# Patient Record
Sex: FEMALE | Race: OTHER | HISPANIC OR LATINO | URBAN - METROPOLITAN AREA
[De-identification: names, ages, dates, MRNs, and addresses within clinical notes are randomized per-mention and may not be internally consistent; named-entity substitution may affect disease eponyms.]

---

## 2022-08-14 ENCOUNTER — EMERGENCY (EMERGENCY)
Facility: HOSPITAL | Age: 42
LOS: 1 days | Discharge: HOME | End: 2022-08-14
Admitting: EMERGENCY MEDICINE

## 2022-08-14 VITALS
DIASTOLIC BLOOD PRESSURE: 82 MMHG | OXYGEN SATURATION: 99 % | HEART RATE: 102 BPM | SYSTOLIC BLOOD PRESSURE: 130 MMHG | TEMPERATURE: 98 F | RESPIRATION RATE: 19 BRPM

## 2022-08-14 LAB
ALBUMIN SERPL ELPH-MCNC: 4.9 G/DL — SIGNIFICANT CHANGE UP (ref 3.3–5)
ALP SERPL-CCNC: 134 U/L — HIGH (ref 40–120)
ALT FLD-CCNC: 89 U/L — HIGH (ref 4–33)
AMPHET UR-MCNC: NEGATIVE — SIGNIFICANT CHANGE UP
ANION GAP SERPL CALC-SCNC: 14 MMOL/L — SIGNIFICANT CHANGE UP (ref 7–14)
APAP SERPL-MCNC: <10 UG/ML — LOW (ref 15–25)
APPEARANCE UR: CLEAR — SIGNIFICANT CHANGE UP
AST SERPL-CCNC: 53 U/L — HIGH (ref 4–32)
B PERT DNA SPEC QL NAA+PROBE: SIGNIFICANT CHANGE UP
B PERT+PARAPERT DNA PNL SPEC NAA+PROBE: SIGNIFICANT CHANGE UP
BACTERIA # UR AUTO: ABNORMAL
BARBITURATES UR SCN-MCNC: NEGATIVE — SIGNIFICANT CHANGE UP
BASOPHILS # BLD AUTO: 0.04 K/UL — SIGNIFICANT CHANGE UP (ref 0–0.2)
BASOPHILS NFR BLD AUTO: 0.6 % — SIGNIFICANT CHANGE UP (ref 0–2)
BENZODIAZ UR-MCNC: NEGATIVE — SIGNIFICANT CHANGE UP
BILIRUB SERPL-MCNC: 0.2 MG/DL — SIGNIFICANT CHANGE UP (ref 0.2–1.2)
BILIRUB UR-MCNC: NEGATIVE — SIGNIFICANT CHANGE UP
BORDETELLA PARAPERTUSSIS (RAPRVP): SIGNIFICANT CHANGE UP
BUN SERPL-MCNC: 10 MG/DL — SIGNIFICANT CHANGE UP (ref 7–23)
C PNEUM DNA SPEC QL NAA+PROBE: SIGNIFICANT CHANGE UP
CALCIUM SERPL-MCNC: 9 MG/DL — SIGNIFICANT CHANGE UP (ref 8.4–10.5)
CHLORIDE SERPL-SCNC: 106 MMOL/L — SIGNIFICANT CHANGE UP (ref 98–107)
CO2 SERPL-SCNC: 21 MMOL/L — LOW (ref 22–31)
COCAINE METAB.OTHER UR-MCNC: NEGATIVE — SIGNIFICANT CHANGE UP
COLOR SPEC: YELLOW — SIGNIFICANT CHANGE UP
CREAT SERPL-MCNC: 0.77 MG/DL — SIGNIFICANT CHANGE UP (ref 0.5–1.3)
CREATININE URINE RESULT, DAU: 128 MG/DL — SIGNIFICANT CHANGE UP
DIFF PNL FLD: ABNORMAL
EGFR: 99 ML/MIN/1.73M2 — SIGNIFICANT CHANGE UP
EOSINOPHIL # BLD AUTO: 0.09 K/UL — SIGNIFICANT CHANGE UP (ref 0–0.5)
EOSINOPHIL NFR BLD AUTO: 1.4 % — SIGNIFICANT CHANGE UP (ref 0–6)
EPI CELLS # UR: 8 /HPF — HIGH (ref 0–5)
ETHANOL SERPL-MCNC: 190 MG/DL — HIGH
FLUAV SUBTYP SPEC NAA+PROBE: SIGNIFICANT CHANGE UP
FLUBV RNA SPEC QL NAA+PROBE: SIGNIFICANT CHANGE UP
GLUCOSE SERPL-MCNC: 93 MG/DL — SIGNIFICANT CHANGE UP (ref 70–99)
GLUCOSE UR QL: NEGATIVE — SIGNIFICANT CHANGE UP
HADV DNA SPEC QL NAA+PROBE: SIGNIFICANT CHANGE UP
HCG SERPL-ACNC: <5 MIU/ML — SIGNIFICANT CHANGE UP
HCOV 229E RNA SPEC QL NAA+PROBE: SIGNIFICANT CHANGE UP
HCOV HKU1 RNA SPEC QL NAA+PROBE: SIGNIFICANT CHANGE UP
HCOV NL63 RNA SPEC QL NAA+PROBE: SIGNIFICANT CHANGE UP
HCOV OC43 RNA SPEC QL NAA+PROBE: SIGNIFICANT CHANGE UP
HCT VFR BLD CALC: 39 % — SIGNIFICANT CHANGE UP (ref 34.5–45)
HGB BLD-MCNC: 13.4 G/DL — SIGNIFICANT CHANGE UP (ref 11.5–15.5)
HMPV RNA SPEC QL NAA+PROBE: SIGNIFICANT CHANGE UP
HPIV1 RNA SPEC QL NAA+PROBE: SIGNIFICANT CHANGE UP
HPIV2 RNA SPEC QL NAA+PROBE: SIGNIFICANT CHANGE UP
HPIV3 RNA SPEC QL NAA+PROBE: SIGNIFICANT CHANGE UP
HPIV4 RNA SPEC QL NAA+PROBE: SIGNIFICANT CHANGE UP
HYALINE CASTS # UR AUTO: 0 /LPF — SIGNIFICANT CHANGE UP (ref 0–7)
IANC: 3.08 K/UL — SIGNIFICANT CHANGE UP (ref 1.8–7.4)
IMM GRANULOCYTES NFR BLD AUTO: 0.2 % — SIGNIFICANT CHANGE UP (ref 0–1.5)
KETONES UR-MCNC: NEGATIVE — SIGNIFICANT CHANGE UP
LEUKOCYTE ESTERASE UR-ACNC: NEGATIVE — SIGNIFICANT CHANGE UP
LYMPHOCYTES # BLD AUTO: 3.05 K/UL — SIGNIFICANT CHANGE UP (ref 1–3.3)
LYMPHOCYTES # BLD AUTO: 46.5 % — HIGH (ref 13–44)
M PNEUMO DNA SPEC QL NAA+PROBE: SIGNIFICANT CHANGE UP
MCHC RBC-ENTMCNC: 31.3 PG — SIGNIFICANT CHANGE UP (ref 27–34)
MCHC RBC-ENTMCNC: 34.4 GM/DL — SIGNIFICANT CHANGE UP (ref 32–36)
MCV RBC AUTO: 91.1 FL — SIGNIFICANT CHANGE UP (ref 80–100)
METHADONE UR-MCNC: NEGATIVE — SIGNIFICANT CHANGE UP
MONOCYTES # BLD AUTO: 0.29 K/UL — SIGNIFICANT CHANGE UP (ref 0–0.9)
MONOCYTES NFR BLD AUTO: 4.4 % — SIGNIFICANT CHANGE UP (ref 2–14)
NEUTROPHILS # BLD AUTO: 3.08 K/UL — SIGNIFICANT CHANGE UP (ref 1.8–7.4)
NEUTROPHILS NFR BLD AUTO: 46.9 % — SIGNIFICANT CHANGE UP (ref 43–77)
NITRITE UR-MCNC: NEGATIVE — SIGNIFICANT CHANGE UP
NRBC # BLD: 0 /100 WBCS — SIGNIFICANT CHANGE UP
NRBC # FLD: 0 K/UL — SIGNIFICANT CHANGE UP
OPIATES UR-MCNC: NEGATIVE — SIGNIFICANT CHANGE UP
OXYCODONE UR-MCNC: NEGATIVE — SIGNIFICANT CHANGE UP
PCP SPEC-MCNC: SIGNIFICANT CHANGE UP
PCP UR-MCNC: NEGATIVE — SIGNIFICANT CHANGE UP
PH UR: 5.5 — SIGNIFICANT CHANGE UP (ref 5–8)
PLATELET # BLD AUTO: 264 K/UL — SIGNIFICANT CHANGE UP (ref 150–400)
POTASSIUM SERPL-MCNC: 4.5 MMOL/L — SIGNIFICANT CHANGE UP (ref 3.5–5.3)
POTASSIUM SERPL-SCNC: 4.5 MMOL/L — SIGNIFICANT CHANGE UP (ref 3.5–5.3)
PROT SERPL-MCNC: 8.5 G/DL — HIGH (ref 6–8.3)
PROT UR-MCNC: ABNORMAL
RAPID RVP RESULT: SIGNIFICANT CHANGE UP
RBC # BLD: 4.28 M/UL — SIGNIFICANT CHANGE UP (ref 3.8–5.2)
RBC # FLD: 13 % — SIGNIFICANT CHANGE UP (ref 10.3–14.5)
RBC CASTS # UR COMP ASSIST: 1 /HPF — SIGNIFICANT CHANGE UP (ref 0–4)
RSV RNA SPEC QL NAA+PROBE: SIGNIFICANT CHANGE UP
RV+EV RNA SPEC QL NAA+PROBE: SIGNIFICANT CHANGE UP
SALICYLATES SERPL-MCNC: <0.3 MG/DL — LOW (ref 15–30)
SARS-COV-2 RNA SPEC QL NAA+PROBE: SIGNIFICANT CHANGE UP
SODIUM SERPL-SCNC: 141 MMOL/L — SIGNIFICANT CHANGE UP (ref 135–145)
SP GR SPEC: 1.02 — SIGNIFICANT CHANGE UP (ref 1.01–1.05)
THC UR QL: NEGATIVE — SIGNIFICANT CHANGE UP
TOXICOLOGY SCREEN, DRUGS OF ABUSE, SERUM RESULT: SIGNIFICANT CHANGE UP
TSH SERPL-MCNC: 1.54 UIU/ML — SIGNIFICANT CHANGE UP (ref 0.27–4.2)
UROBILINOGEN FLD QL: SIGNIFICANT CHANGE UP
WBC # BLD: 6.56 K/UL — SIGNIFICANT CHANGE UP (ref 3.8–10.5)
WBC # FLD AUTO: 6.56 K/UL — SIGNIFICANT CHANGE UP (ref 3.8–10.5)
WBC UR QL: 3 /HPF — SIGNIFICANT CHANGE UP (ref 0–5)

## 2022-08-14 PROCEDURE — 99285 EMERGENCY DEPT VISIT HI MDM: CPT

## 2022-08-14 NOTE — ED PROVIDER NOTE - PATIENT PORTAL LINK FT
You can access the FollowMyHealth Patient Portal offered by [facility] by registering at the following website: [email or web address]. By joining [employer]’s FollowMyHealth portal, you will also be able to view your health information using other applications (apps) compatible with our system.

## 2022-08-14 NOTE — ED PROVIDER NOTE - NSFOLLOWUPCLINICS_GEN_ALL_ED_FT
[facility]  Behavioral Health  [id number] [place] [address]  Phone: [phone]  Fax:

## 2022-08-14 NOTE — ED BEHAVIORAL HEALTH ASSESSMENT NOTE - DESCRIPTION
denies In fair behavioral control. No prns, IMs, restraints given.     Vital Signs Last 24 Hrs  T(C): 37 (14 Aug 2022 22:12), Max: 37 (14 Aug 2022 22:12)  T(F): 98.6 (14 Aug 2022 22:12), Max: 98.6 (14 Aug 2022 22:12)  HR: 84 (14 Aug 2022 22:12) (84 - 102)  BP: 112/94 (14 Aug 2022 22:12) (112/94 - 130/82)  BP(mean): --  RR: 16 (14 Aug 2022 22:12) (16 - 19)  SpO2: 99% (14 Aug 2022 22:12) (99% - 99%)    Parameters below as of 14 Aug 2022 22:12  Patient On (Oxygen Delivery Method): room air see hpi

## 2022-08-14 NOTE — ED BEHAVIORAL HEALTH ASSESSMENT NOTE - RISK ASSESSMENT
Unable to determine Suicide Risk RF: Acute intoxication, reported SI, AH, impulsivity, h/o MDD, not currently engaged in treatment.   PF: supportive family, responsibility toward children, engaged in work, help-seeking, no access to firearms. Patient is now currently sober and denies active or passive SIIP/HIIP or thoughts of self-harm. Pt is future-oriented.     Complete safety assessment cannot be completed at this time, and pt will be held in ED pending further collateral.

## 2022-08-14 NOTE — ED PROVIDER NOTE - PROGRESS NOTE DETAILS
Ever TRENT: Pt signed out to me.  She has been re-assessed by psychiatry.  She is clinically sober, feeling better and now denies SI.  Pt cleared for discharge by psychiatry, her sister will come to pick her up.

## 2022-08-14 NOTE — ED BEHAVIORAL HEALTH ASSESSMENT NOTE - HPI (INCLUDE ILLNESS QUALITY, SEVERITY, DURATION, TIMING, CONTEXT, MODIFYING FACTORS, ASSOCIATED SIGNS AND SYMPTOMS)
41 y/o  F, domiciled with family (sister, brother-in-law, 4 children ages 20, 18, 11 and 4), single, works in construction, PPH of MDD, no known past psychiatric admissions or outpt treatment, no known legal hx, no known aggression/violence, no known medical hx, BIB family secondary to depression and inability to cope in setting of intoxication. In ED, BAL initially 190.     Per ED note "Admits to multiple social stressors. Admits to consuming 6 beers and 4 shots of champagne around 2 pm. Daughter states that patient has been consuming alcohol since yesterday.          Denies HI/AH/VH.  Denies pain, SOB,  fever, chills, chest/abdominal discomfort. Denies falling, punching or kicking any objects. Denies use of  illicit drugs.  No evidence of physical injuries, broken  skin or deformities."    Pt evaluated once BAL <100. Pt is linear, presents as anxious, at times tearful. Pt states she has recent stressor at home as she learned 3 months ago that she is going to be evicted from her house in september and has not been able to find a place to stay. Pt states she was feeling anxious about this. Admits to sometimes feeling sad about this but states it is normal given the stressor. Pt states she drank more than usual two nights ago, and states the following day she continued to drink. Pt would not specify how much she drank, but states it was a lot. Pt states it was because she felt bored. When asked about making comments about SI while intoxicated, pt states she does not feel that way and believes she was misunderstood. Pt adamantly denies suicidal ideation, intention or plan. She denies thoughts of self-harm. Denies HIIP. Pt states she wants to live for her children and she wants to get home to them as soon as possible. She reports being engaged in work. She also states she is very spiritual and God helps her through hard times. She states she agreed to come to the ED because she wanted "psychological help" or someone to talk to for advice. She does not feel that she needs medications. She does not feel she normally has issues with her drinking outside of today, although she admits she sometimes drinks more than she would like to. She denies current depressive sxs. Denies access to firearms. Denies h/o manic or depressive sxs. Pt states she regrets coming to the ED as she is away from her children and she wants to be with them. States her children are being watched by her sister. 43 y/o F, domiciled with family (sister, brother-in-law, 4 children ages 20, 18, 11 and 4), single, works in construction, PPH of MDD, no known past psychiatric admissions or outpt treatment, no known legal hx, no known aggression/violence, no known medical hx, BIB family secondary to depression and inability to cope in setting of etoh intoxication. Per collateral, pt also took 4 tab of tylenol. In ED, BAL initially 190.     Per ED note "Admits to multiple social stressors. Admits to consuming 6 beers and 4 shots of champagne around 2 pm. Daughter states that patient has been consuming alcohol since yesterday. Denies HI/AH/VH.  Denies pain, SOB,  fever, chills, chest/abdominal discomfort. Denies falling, punching or kicking any objects. Denies use of  illicit drugs.  No evidence of physical injuries, broken  skin or deformities."    Pt evaluated once BAL <100. Pt is linear, presents as anxious, at times tearful. Pt states she has recent stressor at home as she learned 3 months ago that she is going to be evicted from her house in september and has not been able to find a place to stay. Pt states she was feeling anxious about this. Admits to sometimes feeling sad about this but states it is normal given the stressor. Pt states she drank more than usual two nights ago, and states the following day she continued to drink. Pt would not specify how much she drank, but states it was a lot. Pt states it was because she felt bored. When asked about making comments about SI while intoxicated, pt states she does not feel that way and believes she was misunderstood. Pt adamantly denies suicidal ideation, intention or plan. She denies thoughts of self-harm. Denies HIIP. Pt states she wants to live for her children and she wants to get home to them as soon as possible. She reports being engaged in work. She also states she is very spiritual and God helps her through hard times. She states she agreed to come to the ED because she wanted "psychological help" or someone to talk to for advice. She does not feel that she needs medications. She does not feel she normally has issues with her drinking outside of today, although she admits she sometimes drinks more than she would like to. She denies current depressive sxs. Denies access to firearms. Denies h/o manic or depressive sxs. Denies other substance use. Pt states she regrets coming to the ED as she is away from her children and she wants to be with them. States her children are being watched by her sister.

## 2022-08-14 NOTE — ED ADULT NURSE NOTE - OBJECTIVE STATEMENT
Received pt in  pt c/o drinking since last night & depression pt verbalizing she took 4 Tylenol to sleep, pt denies si/hi presently safety & comfort measures maintained eval on going.

## 2022-08-14 NOTE — ED PROVIDER NOTE - OBJECTIVE STATEMENT
43 y/o  F hx MDD  BIB family secondary to depression and inability to cope. Admits to multiple social stressors. Admits to consuming 6 beers and 4 shots of champagne around 2 pm. Daughter states that patient has been consuming alcohol since yesterday.          Denies HI/AH/VH.  Denies pain, SOB,  fever, chills, chest/abdominal discomfort. Denies falling, punching or kicking any objects. Denies use of  illicit drugs.  No evidence of physical injuries, broken  skin or deformities. 43 y/o  F hx MDD  BIB family secondary to depression and inability to cope. Admits to multiple social stressors and suicidal ideations. Admit to ingesting 4 Tylenol pills . States " I took it because I wanted to sleep".   Admits to consuming 6 beers and 4 shots of champagne around 2 pm. Daughter states that patient has been consuming alcohol since yesterday.  Denies HI/AH/VH.  Denies pain, SOB,  fever, chills, chest/abdominal discomfort. Denies falling, punching or kicking any objects. Denies use of  illicit drugs.  No evidence of physical injuries, broken  skin or deformities.

## 2022-08-14 NOTE — ED ADULT NURSE REASSESSMENT NOTE - NS ED NURSE REASSESS COMMENT FT1
pt belongings and valuables including cellphone, clothing, and jewlery given to patient daughter- Edel [phone].   [phone] (Edel's huband- call if no answer on Edel phone)
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
## 2022-08-14 NOTE — ED BEHAVIORAL HEALTH ASSESSMENT NOTE - NSBHATTESTTYPESTAFF_PSY_A_CORE
Reason for Disposition  • [1] Can touch chin to chest BUT [2] painful to do it (in cooperative child)    Protocols used: NECK PAIN OR [id number]     Resident

## 2022-08-14 NOTE — ED BEHAVIORAL HEALTH ASSESSMENT NOTE - SUMMARY
41 y/o F, domiciled with family (sister, brother-in-law, 4 children ages 20, 18, 11 and 4), single, works in construction, PPH of MDD, no known past psychiatric admissions or outpt treatment, no known legal hx, no known aggression/violence, no known medical hx, BIB family secondary to depression and inability to cope in setting of psychosocial stressor (pt being evicted from home) and etoh intoxication. Per collateral, pt also took 4 tab of tylenol.     In ED, BAL initially 190. Per ED note, pt admitted to suicidal ideation in setting of multiple stressors, and admitted to taking tylenol in order to "sleep." Pt reassessed by psychiatry once sober. Pt now adamantly denying depressive sxs, and denying reporting suicidal ideation, saying it was a misunderstanding. Pt is able to site multiple protective factors including religiosity/spirituality, her responsibility toward her children, and responsibility to go to work. Pt is open to starting outpatient therapy. Pt is future-oriented at this time.     Sw attempted to obtain collateral from family but no reply. As pt's reported history has been inconsistent between providers, collateral must be obtained for a complete safety assessment.     Will hold pt in ED for further observation and reassessment pending further collateral.

## 2022-08-15 VITALS
OXYGEN SATURATION: 100 % | SYSTOLIC BLOOD PRESSURE: 109 MMHG | RESPIRATION RATE: 17 BRPM | HEART RATE: 88 BPM | DIASTOLIC BLOOD PRESSURE: 66 MMHG | TEMPERATURE: 99 F

## 2022-08-15 DIAGNOSIS — F43.20 ADJUSTMENT DISORDER, UNSPECIFIED: ICD-10-CM

## 2022-08-15 LAB
COVID-19 SPIKE DOMAIN AB INTERP: POSITIVE
COVID-19 SPIKE DOMAIN ANTIBODY RESULT: >250 U/ML — HIGH
SARS-COV-2 IGG+IGM SERPL QL IA: >250 U/ML — HIGH
SARS-COV-2 IGG+IGM SERPL QL IA: POSITIVE

## 2022-08-15 NOTE — ED ADULT NURSE REASSESSMENT NOTE - NS ED NURSE REASSESS COMMENT FT1
Pt sleeping comfortably in bed. Respirations are even and unlabored, endorsing no complaints at this time. Awaiting dispo

## 2022-08-15 NOTE — ED BEHAVIORAL HEALTH NOTE - BEHAVIORAL HEALTH NOTE
Worker met with patient at bedside and utilized [employer] interpre[employer] [place] speaking #id [id number]. Worker provided support for patient. She states that she does not need to be linked to an urgent referral but is willing to take walk in information for both [facility] and [facility] charities. Worker provided a list of substance abuse referrals and also information for [facility], [facility], and [facility]. worker then called pt's daughter ([phone]) id #[id number] who states that the patient's sister is on her way with clothing for the patient's discharge.

## 2022-08-15 NOTE — ED BEHAVIORAL HEALTH NOTE - BEHAVIORAL HEALTH NOTE
The writer contacted [phone] and [phone] to get in touch with Pt's relatives for collateral. Writer called the family several times, but no one picked up. SW to remain available and will continue to follow up.

## 2022-08-16 NOTE — ED BEHAVIORAL HEALTH NOTE - BEHAVIORAL HEALTH NOTE
High Risk Log: Writer called patient  customer is not accepting calls at this time, unable to leave a message.

## 2022-08-17 NOTE — ED BEHAVIORAL HEALTH NOTE - BEHAVIORAL HEALTH NOTE
High Risk Log: Writer called patient  states she's doing well, denies suicidal thoughts.  States she will follow up with outpatient treatment.  Pt was informed if she needed assistance in getting appointment or resources to call the ED and ask to speak with a , which she agreed to.